# Patient Record
(demographics unavailable — no encounter records)

---

## 2025-04-10 NOTE — HISTORY OF PRESENT ILLNESS
[FreeTextEntry1] : Strep test [de-identified] : Patient presents reporting multiple episodes of strep throat in the past.  Status post treatment. also reporting that his wife has had multiple recent strep episodes.  Patient is concerned about being a carrier

## 2025-04-10 NOTE — PHYSICAL EXAM
[No Acute Distress] : no acute distress [Normal] : the outer ears and nose were normal in appearance and the oropharynx was normal [No Respiratory Distress] : no respiratory distress

## 2025-04-10 NOTE — ASSESSMENT
[FreeTextEntry1] : Exposure to strep currently asymptomatic.  Will check throat culture to assess for carrier status Follow-up pending labs [Vaccines Reviewed] : Immunizations reviewed today. Please see immunization details in the vaccine log within the immunization flowsheet.

## 2025-06-19 NOTE — PHYSICAL EXAM
[Normal] : soft, non-tender, non-distended, no masses palpated, no HSM and normal bowel sounds [de-identified] :  Nose: Nasal mucosa without congestion or discharge. Throat: Erythematous pharynx without exudates, uvula midline, no tonsillar enlargement or exudate. Neck: Supple, no lymphadenopathy, no thyroid enlargement. Lungs: Clear to auscultation bilaterally, no wheezes, rales, or rhonchi. [de-identified] : Erythematous pharynx without exudates, uvula midline, no tonsillar enlargement or exudate.

## 2025-06-19 NOTE — REVIEW OF SYSTEMS
[Sore Throat] : sore throat [Negative] : Heme/Lymph [FreeTextEntry4] :  Sore throat; no ear pain or lymphadenopathy.

## 2025-06-19 NOTE — ASSESSMENT
[FreeTextEntry1] : The patient presents with a 2-day history of sore throat without signs of bacterial infection such as fever, lymphadenopathy, or exudates. POCT strep swab is negative; awaiting throat culture results. Given the patient's household situation with a  and an immunocompromised relative, it is prudent to rule out streptococcal pharyngitis despite the negative rapid test. Plan: Prescribe ibuprofen 600 mg orally every 6 hours as needed for pain relief. Advise the patient to monitor symptoms and notify if they worsen or new symptoms develop. Recommend maintaining good hydration and rest. Await throat culture results for definitive diagnosis. If culture is positive, initiate appropriate antibiotic therapy. Educate the patient on signs of potential complications or secondary infections. Discuss the importance of hand hygiene and minimizing contact with vulnerable household members until infection is ruled out.

## 2025-06-19 NOTE — HISTORY OF PRESENT ILLNESS
[FreeTextEntry8] :  37-year-old male who presents today with a chief complaint of a sore throat persisting for the past 2 days. He describes the pain as mild to moderate in intensity. He denies experiencing fever, chills, cough, lymphadenopathy, or ear pain. The patient expresses concern about the potential contagiousness of his condition, as he has a  and an immunocompromised relative at home. He is willing to undergo a strep swab and culture to ensure he is not transmitting any infectious agent to his household members. A point-of-care testing (POCT) strep swab was performed and returned negative; throat culture results are pending.